# Patient Record
Sex: MALE | Employment: FULL TIME | URBAN - METROPOLITAN AREA
[De-identification: names, ages, dates, MRNs, and addresses within clinical notes are randomized per-mention and may not be internally consistent; named-entity substitution may affect disease eponyms.]

---

## 2017-04-06 ENCOUNTER — HOSPITAL ENCOUNTER (EMERGENCY)
Facility: CLINIC | Age: 22
Discharge: HOME | End: 2017-04-06
Attending: FAMILY MEDICINE | Admitting: FAMILY MEDICINE

## 2017-04-06 VITALS
WEIGHT: 142 LBS | BODY MASS INDEX: 22.58 KG/M2 | HEART RATE: 70 BPM | OXYGEN SATURATION: 100 % | RESPIRATION RATE: 20 BRPM | DIASTOLIC BLOOD PRESSURE: 68 MMHG | SYSTOLIC BLOOD PRESSURE: 111 MMHG | TEMPERATURE: 98.3 F

## 2017-04-06 DIAGNOSIS — K11.20 PAROTIDITIS: ICD-10-CM

## 2017-04-06 DIAGNOSIS — R60.9 PAROTID SWELLING: ICD-10-CM

## 2017-04-06 LAB
BASOPHILS # BLD AUTO: 0 10E9/L (ref 0–0.2)
BASOPHILS NFR BLD AUTO: 0 %
DIFFERENTIAL METHOD BLD: ABNORMAL
EOSINOPHIL # BLD AUTO: 0.2 10E9/L (ref 0–0.7)
EOSINOPHIL NFR BLD AUTO: 10 %
ERYTHROCYTE [DISTWIDTH] IN BLOOD BY AUTOMATED COUNT: 12 % (ref 10–15)
HCT VFR BLD AUTO: 45.6 % (ref 40–53)
HGB BLD-MCNC: 15 G/DL (ref 13.3–17.7)
LYMPHOCYTES # BLD AUTO: 1.2 10E9/L (ref 0.8–5.3)
LYMPHOCYTES NFR BLD AUTO: 63.6 %
MCH RBC QN AUTO: 28.4 PG (ref 26.5–33)
MCHC RBC AUTO-ENTMCNC: 32.9 G/DL (ref 31.5–36.5)
MCV RBC AUTO: 86 FL (ref 78–100)
MONOCYTES # BLD AUTO: 0.1 10E9/L (ref 0–1.3)
MONOCYTES NFR BLD AUTO: 5.5 %
NEUTROPHILS # BLD AUTO: 0.4 10E9/L (ref 1.6–8.3)
NEUTROPHILS NFR BLD AUTO: 20.9 %
PLATELET # BLD AUTO: 145 10E9/L (ref 150–450)
PLATELET # BLD EST: ABNORMAL 10*3/UL
RBC # BLD AUTO: 5.28 10E12/L (ref 4.4–5.9)
RBC MORPH BLD: NORMAL
WBC # BLD AUTO: 1.9 10E9/L (ref 4–11)

## 2017-04-06 PROCEDURE — 40000957 ZZHCL STATISTIC MUMPS VIRUS PCR: Performed by: FAMILY MEDICINE

## 2017-04-06 PROCEDURE — 85025 COMPLETE CBC W/AUTO DIFF WBC: CPT | Performed by: FAMILY MEDICINE

## 2017-04-06 PROCEDURE — 99283 EMERGENCY DEPT VISIT LOW MDM: CPT | Mod: Z6 | Performed by: FAMILY MEDICINE

## 2017-04-06 PROCEDURE — 99283 EMERGENCY DEPT VISIT LOW MDM: CPT | Performed by: FAMILY MEDICINE

## 2017-04-06 NOTE — ED AVS SNAPSHOT
[facility], Emergency Department    [address]    [place] [address]    Phone:  [phone]    Fax:  [phone]                                       Abraham Ying   MRN: [id number]    Department:  [facility], Emergency Department   Date of Visit:  4/6/2017           Patient Information     Date Of Birth          1995        Your diagnoses for this visit were:     Parotid swelling-left        You were seen by Mauricio Snyder MD.        Discharge Instructions       You have swelling in the left parotid gland. The most common cause is a small stone in the gland- the treatment for this is sucking lemon drops as much as possible- the sour test makes more saliva and the stone is pushed out.  You could also have mumps. This is an infective disease.  Suggest that you not go to class tomorrow and avoid people contact.  On Monday follow up at [facility].  The mumps test takes 9 days to return from the dept of health  Your blood count shows suppression of the white cells of the blood - this is often seen in an acute viral infection like mumps  Obviously return over the weekend if fevers or chills or vomting or increased swelling    24 Hour Appointment Hotline       To make an appointment at any [facility] clinic, call [phone] ([phone]). If you don't have a family doctor or clinic, we will help you find one. [facility] clinics are conveniently located to serve the needs of you and your family.             Review of your medicines      Our records show that you are taking the medicines listed below. If these are incorrect, please call your family doctor or clinic.        Dose / Directions Last dose taken    ibuprofen 600 MG tablet   Commonly known as:  ADVIL/MOTRIN   Dose:  600 mg   Quantity:  30 tablet        Take 1 tablet (600 mg) by mouth 3 times daily (with meals)   Refills:  1                Procedures and tests performed during your visit     CBC with platelets differential     "Mumps Confirmation PCR      Orders Needing Specimen Collection     None      Pending Results     Date and Time Order Name Status Description    2017 1745 Mumps Confirmation PCR In process             Pending Culture Results     Date and Time Order Name Status Description    2017 1745 Mumps Confirmation PCR In process             Thank you for choosing [facility]       Thank you for choosing [facility] for your care. Our goal is always to provide you with excellent care. Hearing back from our patients is one way we can continue to improve our services. Please take a few minutes to complete the written survey that you may receive in the mail after you visit with us. Thank you!        EventRadar Information     EventRadar lets you send messages to your doctor, view your test results, renew your prescriptions, schedule appointments and more. To sign up, go to [email or web address] . Click on \"Log in\" on the left side of the screen, which will take you to the Welcome page. Then click on \"Sign up Now\" on the right side of the page.     You will be asked to enter the access code listed below, as well as some personal information. Please follow the directions to create your username and password.     Your access code is: [email or web address]  Expires: 2017  6:19 PM     Your access code will  in 90 days. If you need help or a new code, please call your [facility] clinic or [phone].        Care EveryWhere ID     This is your Care EveryWhere ID. This could be used by other organizations to access your [facility] medical records  [id number]        After Visit Summary       This is your record. Keep this with you and show to your community pharmacist(s) and doctor(s) at your next visit.                  "

## 2017-04-06 NOTE — ED AVS SNAPSHOT
[facility], Emergency Department    [address]    [facility] [address]    Phone:  [phone]    Fax:  [phone]                                       Abraham Ying   MRN: [id number]    Department:  [facility], Emergency Department   Date of Visit:  4/6/2017           After Visit Summary Signature Page     I have received my discharge instructions, and my questions have been answered. I have discussed any challenges I see with this plan with the nurse or doctor.    ..........................................................................................................................................  Patient/Patient Representative Signature      ..........................................................................................................................................  Patient Representative Print Name and Relationship to Patient    ..................................................               ................................................  Date                                            Time    ..........................................................................................................................................  Reviewed by Signature/Title    ...................................................              ..............................................  Date                                                            Time

## 2017-04-06 NOTE — ED PROVIDER NOTES
History     Chief Complaint   Patient presents with     Facial Swelling     left cheek swelling over last 3 days, no tooth ache.     HPI  Abraham Ying is a 21 year old male who presents to the Emergency Department for evaluation of facial swelling. Patient states he woke up 5-6 days ago with painful left sided facial swelling that has been unchanged since then. Patient denies dental pain but has been told to have wisdom teeth removed. He also denies fevers, chills, nausea, or vomiting but reports experiencing a mild headache. Initially he had myalgias. No cough or rash or congestion. No other complaints.    PAST MEDICAL HISTORY  Past Medical History:   Diagnosis Date     NO ACTIVE PROBLEMS      PAST SURGICAL HISTORY  Past Surgical History:   Procedure Laterality Date     EYE SURGERY      5 years old     HERNIORRHAPHY INGUINAL  5/23/2012    Procedure:HERNIORRHAPHY INGUINAL; Right Inguinal Hernia Repair; Surgeon:JOHNATHON HAYS; Location:[facility] OR     NO HISTORY OF SURGERY       FAMILY HISTORY  Family History   Problem Relation Age of Onset     DIABETES Maternal Grandmother      DIABETES Maternal Grandfather      Hypertension Maternal Grandmother      Hypertension Maternal Grandfather      Hypertension Paternal Grandmother      Hypertension Paternal Grandfather      SOCIAL HISTORY  Social History   Substance Use Topics     Smoking status: Never Smoker     Smokeless tobacco: Never Used      Comment: Non smoking home     Alcohol use No     MEDICATIONS  No current facility-administered medications for this encounter.      Current Outpatient Prescriptions   Medication     ibuprofen (ADVIL/MOTRIN) 600 MG tablet     ALLERGIES  No Known Allergies      I have reviewed the Medications, Allergies, Past Medical and Surgical History, and Social History in the Epic system.    Review of Systems   Constitutional: Negative for chills and fever.   HENT: Negative for congestion, dental problem, rhinorrhea and sore throat.     Respiratory: Negative for cough.    Gastrointestinal: Negative for nausea and vomiting.   Genitourinary: Negative for dysuria, scrotal swelling and testicular pain.   Musculoskeletal: Positive for myalgias.   Skin: Negative.    Allergic/Immunologic: Negative for immunocompromised state.   Hematological: Does not bruise/bleed easily.       Physical Exam   BP: 103/62  Pulse: 70  Temp: 97  F (36.1  C)  Resp: 14  Weight: 64.4 kg (142 lb)  SpO2: 98 %  Physical Exam   Constitutional: He is oriented to person, place, and time. He appears well-developed and well-nourished. No distress.   HENT:   The left parotid gland is edematous and mildly tender.  Teeth show no abscess or acute issues  No gum swelling   Pharynx is normal  No neck nodes  TMs normal  No erythema at parotid duct entry into mouth   Neck: Normal range of motion. Neck supple.   Cardiovascular: Normal rate and regular rhythm.    Pulmonary/Chest: No respiratory distress.   Lymphadenopathy:     He has no cervical adenopathy.   Neurological: He is alert and oriented to person, place, and time.   Skin: Skin is warm and dry. No rash noted. He is not diaphoretic.   Nursing note and vitals reviewed.      ED Course     ED Course     Procedures        Initially I felt this was likely a parotid stone causing parotiditis.  WBC returns with marked neutropenia- this can be seen with acute viral infection. Mumps being possible. The pt believes he has had immunization for mmr but he is not sure. He is a [employer] student.  Discussed with infectious disease. Mumps buccal swab done and sent to MD.  No school tomorrow  Recheck wbc at [facility] in 3 days on Monday 4/10  Return if any worsening.  Lemon drops for the possibility of stone.  Labs Ordered and Resulted from Time of ED Arrival Up to the Time of Departure from the ED   CBC WITH PLATELETS DIFFERENTIAL - Abnormal; Notable for the following:        Result Value    WBC 1.9 (*)     Platelet Count 145 (*)     Absolute Neutrophil  0.4 (*)     All other components within normal limits   MUMPS CONFIRMATION PCR       Assessments & Plan (with Medical Decision Making)   Left parotid swelling    I have reviewed the nursing notes.    I have reviewed the findings, diagnosis, plan and need for follow up with the patient.    Discharge Medication List as of 4/6/2017  6:19 PM          Final diagnoses:   Parotid swelling-left     IAsiya, am serving as a trained medical scribe to document services personally performed by Mauricio Snyder MD, based on the provider's statements to me.   IMauricio MD, was physically present and have reviewed and verified the accuracy of this note documented by Asiya Ruffin.    4/6/2017   [facility], [place], EMERGENCY DEPARTMENT     Mauricio Snyder MD  04/08/17 2991

## 2017-04-06 NOTE — DISCHARGE INSTRUCTIONS
You have swelling in the left parotid gland. The most common cause is a small stone in the gland- the treatment for this is sucking lemon drops as much as possible- the sour test makes more saliva and the stone is pushed out.  You could also have mumps. This is an infective disease.  Suggest that you not go to class tomorrow and avoid people contact.  On Monday follow up at [facility].  The mumps test takes 9 days to return from the dept of health  Your blood count shows suppression of the white cells of the blood - this is often seen in an acute viral infection like mumps  Obviously return over the weekend if fevers or chills or vomting or increased swelling

## 2017-04-08 ASSESSMENT — ENCOUNTER SYMPTOMS
RHINORRHEA: 0
SORE THROAT: 0
VOMITING: 0
BRUISES/BLEEDS EASILY: 0
NAUSEA: 0
DYSURIA: 0
CHILLS: 0
COUGH: 0
MYALGIAS: 1
FEVER: 0

## 2017-05-04 ENCOUNTER — TELEPHONE (OUTPATIENT)
Dept: EMERGENCY MEDICINE | Facility: CLINIC | Age: 22
End: 2017-05-04

## 2017-05-04 NOTE — TELEPHONE ENCOUNTER
"[facility] Emergency Department Lab result notification:    Reason for call  Abraham called us earlier this afternoon for his Mumps test result from his ED visit on 4-6-17    Lab Result  Mumps Confirmation PCR [[id number]] (Order [id number])   Information table from ED Provider visit on 4/6/17  Symptoms reported at ED visit (Chief complaint, HPI)   Facial Swelling       left cheek swelling over last 3 days, no tooth ache.      HPI  Abraham Ying is a 21 year old male who presents to the Emergency Department for evaluation of facial swelling. Patient states he woke up 5-6 days ago with painful left sided facial swelling that has been unchanged since then. Patient denies dental pain but has been told to have wisdom teeth removed. He also denies fevers, chills, nausea, or vomiting but reports experiencing a mild headache. Initially he had myalgias. No cough or rash or congestion. No other complaints.      ED providers Impression and Plan (applicable information) Initially I felt this was likely a parotid stone causing parotiditis.  WBC returns with marked neutropenia- this can be seen with acute viral infection. Mumps being possible. The pt believes he has had immunization for mmr but he is not sure. He is a [employer] student.  Discussed with infectious disease. Mumps buccal swab done and sent to [facility].  No school tomorrow  Recheck wbc at [facility] in 3 days on Monday 4/10  Return if any worsening.  Lemon drops for the possibility of stone.   Miscellaneous information Provider seen 4/6/17  Mauricio Snyder MD   Emergency Medicine      RN Assessment (Patient s current Symptoms):  Patient calling for mumps test result from 4/6/17 ED visit at [facility]. He said he is feeling much better although had not heard what the test result was. Both his room mate and his girlfriend came down with mumps about a week after his ED visit. The patient said he had vacationed March 2017 in [place] [facility], "where they had a big measles " "outbreak.\"  RN Recommendations/Instructions per [place] ED lab result protocol  Lab review in EPIC said the mumps test was still in process. I called the [employer] lab at [phone] and was told the specimens go to the [facility]. I spoke with Sienna from the virology lab at [facility] ([phone]) who said they never receive the specimen. I called the [facility] lab back [phone], and Dali said I would need to speak with someone from the [facility] lab, since the specimen didn't come to the [facility] back at all. I then spoke with the [facility] Acute care lab [phone], Evening supervisor Oscar Pierce. Oscar said he would follow up on this situation and call us back, so we can let the patient know further. Oscar also said he would fill out an I Care form. I did call the patient back at 4:40 pm today to let him know we are trying to tract down the lab result and we would let him know as soon as we heard back from the [facility] lab. It also appears that his WBC and his Platelets are low. Both situations very important to follow up with a provider for further evaluation. He said he would see someone in 2-3 weeks because of his insurance situation. I advised him to be seen by a provider this week. Patient voices understanding and not sure what he will do yet.    Please Contact your PCP clinic or return to the Emergency department if your:    Symptoms return.    Symptoms worsen or other concerning symptom's.    PCP follow-up Questions asked: YES       Jessica Orantes RN  [place] [facility] RN  Lung Nodule and ED Lab Result F/u RN  Epic pool (ED late result f/u RN): P [id number]  # [phone]        "

## 2017-05-05 LAB
MUMPS CONFIRMATION PCR: NORMAL
MUMPS SPECIMEN TYPE: NORMAL

## 2017-05-05 NOTE — TELEPHONE ENCOUNTER
This nurse did receive call from Zeeshan at [facility] at approx. 10:30am, reports their ([facility]) system has a cut off time of 10:30, and if specimen not received by that time, could be delayed in being entered into their system until the next day.  This could be why they have not noted specimen yet.      At 12:45pm, this nurse did receive call from [facility] who reports they (lab) did contact patient who is not interested in finding out the results of Mumps testing, and will have charge credited to their acct.  Per lab information, they (lab) has all information indicating sample was sent and received appropriately to [facility], and lab will continue to f/u with [facility] as a root cause analysis of event, if specimen still not yet located.    Lab reports no further need for ED lab RN to f/u with this.    Adelaide Elliott, RN    [place] Access Services RN  Lung Nodule and ED Lab Results F/U RN  Epic pool (ED late result f/u RN) : [id number]   # [phone]

## 2017-06-19 ENCOUNTER — OFFICE VISIT (OUTPATIENT)
Dept: URGENT CARE | Facility: URGENT CARE | Age: 22
End: 2017-06-19
Payer: MEDICAID

## 2017-06-19 ENCOUNTER — RADIANT APPOINTMENT (OUTPATIENT)
Dept: GENERAL RADIOLOGY | Facility: CLINIC | Age: 22
End: 2017-06-19
Attending: PHYSICIAN ASSISTANT
Payer: MEDICAID

## 2017-06-19 VITALS
RESPIRATION RATE: 12 BRPM | OXYGEN SATURATION: 99 % | BODY MASS INDEX: 22.42 KG/M2 | HEART RATE: 79 BPM | WEIGHT: 141 LBS | TEMPERATURE: 98.7 F

## 2017-06-19 DIAGNOSIS — S69.92XA INJURY OF LEFT THUMB, INITIAL ENCOUNTER: Primary | ICD-10-CM

## 2017-06-19 DIAGNOSIS — K12.0 CANKER SORES ORAL: ICD-10-CM

## 2017-06-19 DIAGNOSIS — S69.92XA INJURY OF LEFT THUMB, INITIAL ENCOUNTER: ICD-10-CM

## 2017-06-19 PROCEDURE — 99214 OFFICE O/P EST MOD 30 MIN: CPT | Performed by: PHYSICIAN ASSISTANT

## 2017-06-19 PROCEDURE — 73140 X-RAY EXAM OF FINGER(S): CPT | Mod: LT

## 2017-06-19 NOTE — NURSING NOTE
"Chief Complaint   Patient presents with     Urgent Care     Trauma     patient injured his left thumb 2 months ago playing basketball. Still having pain and swelling.     Mouth Problem     sores inside the mouth      Initial Pulse 79  Temp 98.7  F (37.1  C)  Resp 12  Wt 141 lb (64 kg)  SpO2 99%  BMI 22.42 kg/m2 Estimated body mass index is 22.42 kg/(m^2) as calculated from the following:    Height as of 10/1/16: 5' 6.5\" (1.689 m).    Weight as of this encounter: 141 lb (64 kg)..    S NEVAEH, CMA    "

## 2017-06-19 NOTE — MR AVS SNAPSHOT
"              After Visit Summary   2017    Abraham Ying    MRN: [id number]           Patient Information     Date Of Birth          1995        Visit Information        Provider Department      2017 6:00 PM Symone Conti PA-C [facility]        Today's Diagnoses     Injury of left thumb, initial encounter    -  1    Canker sores oral           Follow-ups after your visit        Who to contact     If you have questions or need follow up information about today's clinic visit or your schedule please contact [facility] directly at [phone].  Normal or non-critical lab and imaging results will be communicated to you by IQumulushart, letter or phone within 4 business days after the clinic has received the results. If you do not hear from us within 7 days, please contact the clinic through IQumulushart or phone. If you have a critical or abnormal lab result, we will notify you by phone as soon as possible.  Submit refill requests through Wylio or call your pharmacy and they will forward the refill request to us. Please allow 3 business days for your refill to be completed.          Additional Information About Your Visit        MyChart Information     Wylio lets you send messages to your doctor, view your test results, renew your prescriptions, schedule appointments and more. To sign up, go to [email or web address] . Click on \"Log in\" on the left side of the screen, which will take you to the Welcome page. Then click on \"Sign up Now\" on the right side of the page.     You will be asked to enter the access code listed below, as well as some personal information. Please follow the directions to create your username and password.     Your access code is: [email or web address]  Expires: 10/25/2017  4:39 PM     Your access code will  in 90 days. If you need help or a new code, please call your [facility] clinic or [phone].        Care EveryWhere ID     This is your " Care EveryWhere ID. This could be used by other organizations to access your [facility] medical records  [id number]        Your Vitals Were     Pulse Temperature Respirations Pulse Oximetry BMI (Body Mass Index)       79 98.7  F (37.1  C) 12 99% 22.42 kg/m2        Blood Pressure from Last 3 Encounters:   04/06/17 111/68   12/23/16 110/70   10/01/16 112/56    Weight from Last 3 Encounters:   06/19/17 141 lb (64 kg)   04/06/17 142 lb (64.4 kg)   12/23/16 137 lb (62.1 kg)                 Today's Medication Changes          These changes are accurate as of: 6/19/17 11:59 PM.  If you have any questions, ask your nurse or doctor.               Start taking these medicines.        Dose/Directions    order for DME   Used for:  Injury of left thumb, initial encounter   Started by:  Symone Conti PA-C        Thumb brace   Quantity:  1 Device   Refills:  0            Where to get your medicines      Some of these will need a paper prescription and others can be bought over the counter.  Ask your nurse if you have questions.     Bring a paper prescription for each of these medications     order for DME                Primary Care Provider    Physician No Ref-Primary       No address on file        Equal Access to Services     SHELTON GONZALEZ AH: Gregory Oakes, wamaribeth molina, qaybta kaalmada ceci, hemanth elder. So [facility] [phone].    ATENCIÓN: Si habla español, tiene a jeong disposición servicios gratuitos de asistencia lingüística. Llame al [phone].    We comply with applicable federal civil rights laws and [place] laws. We do not discriminate on the basis of race, color, national origin, age, disability sex, sexual orientation or gender identity.            Thank you!     Thank you for choosing [facility]  for your care. Our goal is always to provide you with excellent care. Hearing back from our patients is one way we can continue to improve our  services. Please take a few minutes to complete the written survey that you may receive in the mail after your visit with us. Thank you!             Your Updated Medication List - Protect others around you: Learn how to safely use, store and throw away your medicines at www.disposemymeds.org.          This list is accurate as of: 6/19/17 11:59 PM.  Always use your most recent med list.                   Brand Name Dispense Instructions for use Diagnosis    ibuprofen 600 MG tablet    ADVIL/MOTRIN    30 tablet    Take 1 tablet (600 mg) by mouth 3 times daily (with meals)    Acute left-sided low back pain with left-sided sciatica       order for DME     1 Device    Thumb brace    Injury of left thumb, initial encounter

## 2017-07-27 NOTE — PROGRESS NOTES
SUBJECTIVE:  Chief Complaint   Patient presents with     Urgent Care     Trauma     patient injured his left thumb 2 months ago playing basketball. Still having pain and swelling.     Mouth Problem     sores inside the mouth     Abraham Ying is a 21 year old male presents with a chief complaint of left thunb pain, swelling, tenderness and decreased range of motion.  The injury occurred 2 month(s) ago.   The injury happened while playing basketball. How: jammed and bent backward.  Still continues to have pain and swelling.  Has continued to play.  Wants x-ray.   Pain exacerbated by flexion/extension.  Relieved by rest.  He treated it initially with no therapy. This is the first time this type of injury has occurred to this patient.   Also with some canker sores in mouth    Past Medical History:   Diagnosis Date     NO ACTIVE PROBLEMS      Current Outpatient Prescriptions   Medication Sig Dispense Refill     order for DME Thumb brace 1 Device 0     ibuprofen (ADVIL/MOTRIN) 600 MG tablet Take 1 tablet (600 mg) by mouth 3 times daily (with meals) (Patient not taking: Reported on 6/19/2017) 30 tablet 1     Social History   Substance Use Topics     Smoking status: Never Smoker     Smokeless tobacco: Never Used      Comment: Non smoking home     Alcohol use No       ROS:  Review of systems negative except as stated above.    EXAM:   Pulse 79  Temp 98.7  F (37.1  C)  Resp 12  Wt 141 lb (64 kg)  SpO2 99%  BMI 22.42 kg/m2  Gen: healthy,alert,no distress  Extremity: finger  first has FROM but focal tenderness in MCP joint.  Very mild swelling noted.  Tendon function intact and normal strength.  .   There is not compromise to the distal circulation.  Pulses are +2 and CRT is brisk  GENERAL APPEARANCE: healthy, alert and no distress  EXTREMITIES: peripheral pulses normal  SKIN: no suspicious lesions or rashes  NEURO: Normal strength and tone, sensory exam grossly normal, mentation intact and speech normal  MOUTH-  2  canker sores noted.      X-RAY was done. - no fracture noted.      assessment/plan:  (S69.92XA) Injury of left thumb, initial encounter  (primary encounter diagnosis)  Comment:   Plan: XR Finger Left G/E 2 Views, order for DME        Ice and OTC med for swelling and pain.  Thumb brace given to isolate for next 2 weeks and needs to rest thumb.   Fu with sports med if sx persist      (K12.0) Canker sores oral  Comment:   Plan: supportive cares.  Declines numbing med.  Reassured that will resolve on own.  FU with PCP as needed.

## 2021-02-09 ENCOUNTER — OFFICE VISIT (OUTPATIENT)
Dept: FAMILY MEDICINE | Facility: CLINIC | Age: 26
End: 2021-02-09
Payer: COMMERCIAL

## 2021-02-09 VITALS
WEIGHT: 162 LBS | SYSTOLIC BLOOD PRESSURE: 113 MMHG | DIASTOLIC BLOOD PRESSURE: 76 MMHG | OXYGEN SATURATION: 97 % | HEART RATE: 89 BPM | HEIGHT: 68 IN | RESPIRATION RATE: 16 BRPM | TEMPERATURE: 97.7 F | BODY MASS INDEX: 24.55 KG/M2

## 2021-02-09 DIAGNOSIS — Z11.3 SCREEN FOR STD (SEXUALLY TRANSMITTED DISEASE): ICD-10-CM

## 2021-02-09 DIAGNOSIS — R10.84 ABDOMINAL PAIN, GENERALIZED: Primary | ICD-10-CM

## 2021-02-09 DIAGNOSIS — D70.9 NEUTROPENIA, UNSPECIFIED TYPE (H): ICD-10-CM

## 2021-02-09 LAB
BASOPHILS # BLD AUTO: 0 10E9/L (ref 0–0.2)
BASOPHILS NFR BLD AUTO: 1.1 %
CRP SERPL-MCNC: <2.9 MG/L (ref 0–8)
DIFFERENTIAL METHOD BLD: ABNORMAL
EOSINOPHIL # BLD AUTO: 0.1 10E9/L (ref 0–0.7)
EOSINOPHIL NFR BLD AUTO: 3.9 %
ERYTHROCYTE [DISTWIDTH] IN BLOOD BY AUTOMATED COUNT: 12 % (ref 10–15)
HCT VFR BLD AUTO: 47.2 % (ref 40–53)
HGB BLD-MCNC: 16.1 G/DL (ref 13.3–17.7)
LIPASE SERPL-CCNC: 148 U/L (ref 73–393)
LYMPHOCYTES # BLD AUTO: 1 10E9/L (ref 0.8–5.3)
LYMPHOCYTES NFR BLD AUTO: 35 %
MCH RBC QN AUTO: 28.9 PG (ref 26.5–33)
MCHC RBC AUTO-ENTMCNC: 34.1 G/DL (ref 31.5–36.5)
MCV RBC AUTO: 85 FL (ref 78–100)
MONOCYTES # BLD AUTO: 0.3 10E9/L (ref 0–1.3)
MONOCYTES NFR BLD AUTO: 8.8 %
NEUTROPHILS # BLD AUTO: 1.5 10E9/L (ref 1.6–8.3)
NEUTROPHILS NFR BLD AUTO: 51.2 %
PLATELET # BLD AUTO: 209 10E9/L (ref 150–450)
RBC # BLD AUTO: 5.57 10E12/L (ref 4.4–5.9)
T PALLIDUM AB SER QL: NONREACTIVE
WBC # BLD AUTO: 2.8 10E9/L (ref 4–11)

## 2021-02-09 PROCEDURE — 83516 IMMUNOASSAY NONANTIBODY: CPT | Performed by: PHYSICIAN ASSISTANT

## 2021-02-09 PROCEDURE — 80053 COMPREHEN METABOLIC PANEL: CPT | Performed by: PHYSICIAN ASSISTANT

## 2021-02-09 PROCEDURE — 85025 COMPLETE CBC W/AUTO DIFF WBC: CPT | Performed by: PHYSICIAN ASSISTANT

## 2021-02-09 PROCEDURE — 83690 ASSAY OF LIPASE: CPT | Performed by: PHYSICIAN ASSISTANT

## 2021-02-09 PROCEDURE — 86803 HEPATITIS C AB TEST: CPT | Performed by: PHYSICIAN ASSISTANT

## 2021-02-09 PROCEDURE — 36415 COLL VENOUS BLD VENIPUNCTURE: CPT | Performed by: PHYSICIAN ASSISTANT

## 2021-02-09 PROCEDURE — 99000 SPECIMEN HANDLING OFFICE-LAB: CPT | Performed by: PHYSICIAN ASSISTANT

## 2021-02-09 PROCEDURE — 86140 C-REACTIVE PROTEIN: CPT | Performed by: PHYSICIAN ASSISTANT

## 2021-02-09 PROCEDURE — 87591 N.GONORRHOEAE DNA AMP PROB: CPT | Performed by: PHYSICIAN ASSISTANT

## 2021-02-09 PROCEDURE — 86780 TREPONEMA PALLIDUM: CPT | Mod: 90 | Performed by: PHYSICIAN ASSISTANT

## 2021-02-09 PROCEDURE — 87491 CHLMYD TRACH DNA AMP PROBE: CPT | Performed by: PHYSICIAN ASSISTANT

## 2021-02-09 PROCEDURE — 82150 ASSAY OF AMYLASE: CPT | Performed by: PHYSICIAN ASSISTANT

## 2021-02-09 PROCEDURE — 99203 OFFICE O/P NEW LOW 30 MIN: CPT | Performed by: PHYSICIAN ASSISTANT

## 2021-02-09 PROCEDURE — 86256 FLUORESCENT ANTIBODY TITER: CPT | Mod: 90 | Performed by: PHYSICIAN ASSISTANT

## 2021-02-09 PROCEDURE — 87389 HIV-1 AG W/HIV-1&-2 AB AG IA: CPT | Performed by: PHYSICIAN ASSISTANT

## 2021-02-09 ASSESSMENT — MIFFLIN-ST. JEOR: SCORE: 1699.33

## 2021-02-09 NOTE — PROGRESS NOTES
Assessment & Plan     Abdominal pain, generalized    - CBC with platelets differential  - CRP, inflammation  - Comprehensive metabolic panel  - Lipase  - Amylase  - Endomysial Antibody IgA by IFA  - Deamidated Gliadin Peptide Allison IgA IgG  - Tissue transglutaminase allison IgA and IgG    Screen for STD (sexually transmitted disease)    - NEISSERIA GONORRHOEA PCR  - CHLAMYDIA TRACHOMATIS PCR  - HIV Antigen Antibody Combo  - Treponema Abs w Reflex to RPR and Titer  - Hepatitis C antibody    Neutropenia, unspecified type (H)  CBC did come back showing low WBC, which has been seen in past.  Was quite low 3 years ago and was thought to be post viral.  No recent symptoms to explain that today.  Will contact hematology to see if I can start work up or if they would rather see.  - E-CONSULT TO HEMATOLOGY (ADULT OUTPATIENT PCP TO SPECIALIST)             Tobacco Cessation:   reports that he has been smoking other. He has never used smokeless tobacco.          No follow-ups on file.    PATRICIA [facility]   Axel is a 25 year old who presents for the following health issues     HPI       Abdominal/Flank Pain  Onset/Duration: 5-6 months ago   Description:   Character: Cramping  Location: epigastric region  Radiation:  Back  Intensity: moderate  Progression of Symptoms:  same  Accompanying Signs & Symptoms:  Fever/Chills: no  Gas/Bloating: YES  Nausea: YES- sometimes  Vomitting: no  Diarrhea: YES  Constipation: YES  Dysuria or Hematuria: no  History:   Trauma: no  Previous similar pain: no  Previous tests done: none  Precipitating factors:   Does the pain change with:     Food: YES    Bowel Movement: YES    Urination: no   Other factors:  no  Therapies tried and outcome: change in diet , increased in fluids          Review of Systems   Constitutional, HEENT, cardiovascular, pulmonary, gi and gu systems are negative, except as otherwise noted.      Objective    /76    "Pulse 89   Temp 97.7  F (36.5  C) (Skin)   Resp 16   Ht 1.727 m (5' 8\")   Wt 73.5 kg (162 lb)   SpO2 97%   BMI 24.63 kg/m    Body mass index is 24.63 kg/m .  Physical Exam   GENERAL: alert and no distress  EYES: Eyes grossly normal to inspection  RESP: lungs clear to auscultation - no rales, rhonchi or wheezes  CV: regular rate and rhythm, normal S1 S2, no S3 or S4, no murmur, click or rub, no peripheral edema and peripheral pulses strong  ABDOMEN: soft, nontender, no hepatosplenomegaly, no masses and bowel sounds normal  PSYCH: mentation appears normal, affect normal/bright    Results for orders placed or performed in visit on 02/09/21   CBC with platelets differential     Status: Abnormal   Result Value Ref Range    WBC 2.8 (L) 4.0 - 11.0 10e9/L    RBC Count 5.57 4.4 - 5.9 10e12/L    Hemoglobin 16.1 13.3 - 17.7 g/dL    Hematocrit 47.2 40.0 - 53.0 %    MCV 85 78 - 100 fl    MCH 28.9 26.5 - 33.0 pg    MCHC 34.1 31.5 - 36.5 g/dL    RDW 12.0 10.0 - 15.0 %    Platelet Count 209 150 - 450 10e9/L    % Neutrophils 51.2 %    % Lymphocytes 35.0 %    % Monocytes 8.8 %    % Eosinophils 3.9 %    % Basophils 1.1 %    Absolute Neutrophil 1.5 (L) 1.6 - 8.3 10e9/L    Absolute Lymphocytes 1.0 0.8 - 5.3 10e9/L    Absolute Monocytes 0.3 0.0 - 1.3 10e9/L    Absolute Eosinophils 0.1 0.0 - 0.7 10e9/L    Absolute Basophils 0.0 0.0 - 0.2 10e9/L    Diff Method Automated Method    CRP, inflammation     Status: None   Result Value Ref Range    CRP Inflammation <2.9 0.0 - 8.0 mg/L   Comprehensive metabolic panel     Status: Abnormal   Result Value Ref Range    Sodium 140 133 - 144 mmol/L    Potassium 3.9 3.4 - 5.3 mmol/L    Chloride 109 94 - 109 mmol/L    Carbon Dioxide 24 20 - 32 mmol/L    Anion Gap 7 3 - 14 mmol/L    Glucose 116 (H) 70 - 99 mg/dL    Urea Nitrogen 15 7 - 30 mg/dL    Creatinine 0.81 0.66 - 1.25 mg/dL    GFR Estimate >90 >60 mL/min/[1.73_m2]    GFR Estimate If Black >90 >60 mL/min/[1.73_m2]    Calcium 9.5 8.5 - 10.1 " mg/dL    Bilirubin Total 0.5 0.2 - 1.3 mg/dL    Albumin 3.9 3.4 - 5.0 g/dL    Protein Total 7.3 6.8 - 8.8 g/dL    Alkaline Phosphatase 59 40 - 150 U/L    ALT 23 0 - 70 U/L    AST 11 0 - 45 U/L   Lipase     Status: None   Result Value Ref Range    Lipase 148 73 - 393 U/L   Amylase     Status: None   Result Value Ref Range    Amylase 91 30 - 110 U/L   Endomysial Antibody IgA by IFA     Status: None   Result Value Ref Range    Endomysial Antibody IgA by IFA <1:10 <1:10   Deamidated Gliadin Peptide Allison IgA IgG     Status: None   Result Value Ref Range    Deamidated Gliadin Allison, IgA 2 <7 U/mL    Deamidated Gliadin Allison, IgG 1 <7 U/mL   Tissue transglutaminase allison IgA and IgG     Status: None   Result Value Ref Range    Tissue Transglutaminase Antibody IgA 1 <7 U/mL    Tissue Transglutaminase Allison IgG <1 <7 U/mL   HIV Antigen Antibody Combo     Status: None   Result Value Ref Range    HIV Antigen Antibody Combo Nonreactive NR^Nonreactive       Treponema Abs w Reflex to RPR and Titer     Status: None   Result Value Ref Range    Treponema Antibodies Nonreactive NR^Nonreactive   Hepatitis C antibody     Status: None   Result Value Ref Range    Hepatitis C Antibody Nonreactive NR^Nonreactive   NEISSERIA GONORRHOEA PCR     Status: None    Specimen: Urine   Result Value Ref Range    Specimen Descrip Urine     N Gonorrhea PCR Negative NEG^Negative   CHLAMYDIA TRACHOMATIS PCR     Status: None    Specimen: Urine   Result Value Ref Range    Specimen Description Urine     Chlamydia Trachomatis PCR Negative NEG^Negative

## 2021-02-10 LAB
ALBUMIN SERPL-MCNC: 3.9 G/DL (ref 3.4–5)
ALP SERPL-CCNC: 59 U/L (ref 40–150)
ALT SERPL W P-5'-P-CCNC: 23 U/L (ref 0–70)
AMYLASE SERPL-CCNC: 91 U/L (ref 30–110)
ANION GAP SERPL CALCULATED.3IONS-SCNC: 7 MMOL/L (ref 3–14)
AST SERPL W P-5'-P-CCNC: 11 U/L (ref 0–45)
BILIRUB SERPL-MCNC: 0.5 MG/DL (ref 0.2–1.3)
BUN SERPL-MCNC: 15 MG/DL (ref 7–30)
C TRACH DNA SPEC QL NAA+PROBE: NEGATIVE
CALCIUM SERPL-MCNC: 9.5 MG/DL (ref 8.5–10.1)
CHLORIDE SERPL-SCNC: 109 MMOL/L (ref 94–109)
CO2 SERPL-SCNC: 24 MMOL/L (ref 20–32)
CREAT SERPL-MCNC: 0.81 MG/DL (ref 0.66–1.25)
ENDOMYSIUM IGA TITR SER IF: NORMAL {TITER}
GFR SERPL CREATININE-BSD FRML MDRD: >90 ML/MIN/{1.73_M2}
GLIADIN IGA SER-ACNC: 2 U/ML
GLIADIN IGG SER-ACNC: 1 U/ML
GLUCOSE SERPL-MCNC: 116 MG/DL (ref 70–99)
HCV AB SERPL QL IA: NONREACTIVE
HIV 1+2 AB+HIV1 P24 AG SERPL QL IA: NONREACTIVE
N GONORRHOEA DNA SPEC QL NAA+PROBE: NEGATIVE
POTASSIUM SERPL-SCNC: 3.9 MMOL/L (ref 3.4–5.3)
PROT SERPL-MCNC: 7.3 G/DL (ref 6.8–8.8)
SODIUM SERPL-SCNC: 140 MMOL/L (ref 133–144)
SPECIMEN SOURCE: NORMAL
SPECIMEN SOURCE: NORMAL
TTG IGA SER-ACNC: 1 U/ML
TTG IGG SER-ACNC: <1 U/ML

## 2021-02-11 NOTE — RESULT ENCOUNTER NOTE
Please call with results.    Negative for all STDs.  No sign of celiac disease (gluten issues), or inflammatory causes.  No sign of pancreatic stress.  While I am unsure if it is playing a role in his symptoms, his white blood cell count is low.  He did have this 3 years ago, which was thought to be post viral.  I do think this deserves some follow up, and have referred him to hematology for further evaluation.  May be playing a role in his symptoms, will let them weigh in.    Zeeshan Olea PA-C

## 2021-02-12 ENCOUNTER — E-CONSULT (OUTPATIENT)
Dept: ONCOLOGY | Facility: CLINIC | Age: 26
End: 2021-02-12
Payer: COMMERCIAL

## 2021-02-12 ENCOUNTER — TELEPHONE (OUTPATIENT)
Dept: FAMILY MEDICINE | Facility: CLINIC | Age: 26
End: 2021-02-12

## 2021-02-12 DIAGNOSIS — D70.9 NEUTROPENIA, UNSPECIFIED TYPE (H): Primary | ICD-10-CM

## 2021-02-12 PROCEDURE — 99451 NTRPROF PH1/NTRNET/EHR 5/>: CPT | Performed by: INTERNAL MEDICINE

## 2021-02-12 NOTE — TELEPHONE ENCOUNTER
LUC,  HANNAH    Informed pt below  We can call him on cell when next steps are available and and ok to leave a detailed message.    Thanks,  Carla Zavala RN          Can call with the above note, but I am doing an E consult with the hematologist, so they will contact me directed on next steps for him.     Zeeshan Olea, Brad Marie PA-C   2/11/2021  5:42 PM CST      Please call with results.     Negative for all STDs.  No sign of celiac disease (gluten issues), or inflammatory causes.  No sign of pancreatic stress.  While I am unsure if it is playing a role in his symptoms, his white blood cell count is low.  He did have this 3 years ago, which was thought to be post viral.  I do think this deserves some follow up, and have referred him to hematology for further evaluation.  May be playing a role in his symptoms, will let them weigh in.     Zeeshan Olea PA-C

## 2021-02-12 NOTE — PROGRESS NOTES
"2/12/2021     E-Consult has been accepted. ++Must be completed or will be sent back to the E-Consultant    Interprofessional consultation requested by:  Brad Olea PA-C      Clinical Question/Purpose: neutropenia    Patient assessment and information reviewed: 25 year old man had neutropenia, mild thrombocytopenia when acutely ill with parotiditis ins 2017. Now on 2/9/21, has absolute neutrophil count (ANC) of 1.5 with total WBC of 2.8, normal Hgb and platelet count. He had labs due to vague abdominal pain. He has negative TTG antibodies. Chart has no record of problems with frequent infections. Since he has normal Hgb and platelet count, unlikely to have a serious bone marrow problem. He is negative for HIV and hep C, but still need to consider hepatitis B. Also need to check B12.  My suspicion for these are low, but would not want to miss.  The most likely cause of the mild neutropenia is that he simiply has a slightly lower set point for his neutrophil count - our \"normal\" values are based mainly on people of northern  ancestry, so people of other ancestry may run a WBC and neutrophil count that is slightly lower than \"normal\" and of no clinical concern. An ANC of 1.5 is completely adequate, and would be listed as within normal limits in many labs. All of his other subsets of white cells are normal, so I am not concerned about a total WBC of 2.8. But it does show the importance of obtaining a differential in him if a CBC is checked.    Recommendations: Check B12, hepatitis B surface antibody, surface antigen and core antibody.   No need for routine checks of complete blood count with differential. But if checked because he has symptoms that warrant checking the blood counts (fever, symptoms of pneumonia, etc), always check a differential.        The recommendations provided in this E-Consult are based on the clinical data available to me at this time, and are furnished without the benefit of " a comprehensive in-person or virtual patient evaluation, Any new clinical issues or changes in patient status since the filing of this E-Consult will need to be taken into account when assessing these recommendations. Please contact me if you have further questions.    My total time spent reviewing clinical information and formulating assessment was 10 minutes.    Report sent automatically to requesting provider once signed.     Charge codes: [id number] (5+ minutes)                  [id number] (No Charge code)    Brigitte Be MD  Hematology

## 2021-02-12 NOTE — TELEPHONE ENCOUNTER
I did get the consult back from the specialist.  They do not think this is of huge concern, and most likely just a normal finding for him.  The did recommend a follow up blood test, which I will place, so he can schedule that follow up.

## 2021-03-12 ENCOUNTER — TELEPHONE (OUTPATIENT)
Dept: FAMILY MEDICINE | Facility: CLINIC | Age: 26
End: 2021-03-12

## 2021-03-12 DIAGNOSIS — D70.9 NEUTROPENIA, UNSPECIFIED TYPE (H): ICD-10-CM

## 2021-03-12 LAB — VIT B12 SERPL-MCNC: 369 PG/ML (ref 193–986)

## 2021-03-12 PROCEDURE — 87340 HEPATITIS B SURFACE AG IA: CPT | Performed by: PHYSICIAN ASSISTANT

## 2021-03-12 PROCEDURE — 82607 VITAMIN B-12: CPT | Performed by: PHYSICIAN ASSISTANT

## 2021-03-12 PROCEDURE — 86704 HEP B CORE ANTIBODY TOTAL: CPT | Performed by: PHYSICIAN ASSISTANT

## 2021-03-12 PROCEDURE — 86706 HEP B SURFACE ANTIBODY: CPT | Performed by: PHYSICIAN ASSISTANT

## 2021-03-12 PROCEDURE — 36415 COLL VENOUS BLD VENIPUNCTURE: CPT | Performed by: PHYSICIAN ASSISTANT

## 2021-03-12 NOTE — TELEPHONE ENCOUNTER
Reason for Call:  Other questions     detailed comments: patient came in for lab work today.  He also has questions for Zeeshan and wants to know if issues could be due to celiac disease or IBS. And patient is asking for a call back,     Phone Number Patient can be reached at: Cell number on file:    Telephone Information:   Mobile [phone]     Best Time: any    Can we leave a detailed message on this number? YES    Call taken on 3/12/2021 at 2:03 PM by Jessica Oconnell

## 2021-03-12 NOTE — TELEPHONE ENCOUNTER
JS,  Spoke to pt and he is wanting to know if his symptoms are related to celiac or IBS.  Please advise.  Thanks,  Carla Zavala RN

## 2021-03-12 NOTE — LETTER
March 15, 2021      Abraham SEAN Ying  [id number] [facility]  [place] [address]        Dear ,    We are writing to inform you of your test results.    Your test results fall within the expected range(s) or remain unchanged from previous results.  Please continue with current treatment plan.    Resulted Orders   Hepatitis B core antibody   Result Value Ref Range    Hepatitis B Core Ann Nonreactive NR^Nonreactive   Hepatitis B Surface Antibody   Result Value Ref Range    Hepatitis B Surface Antibody 1.11 <8.00 m[IU]/mL      Comment:      Nonreactive, No antibody detected when the value is less than 8.00 m[IU]/mL.   Hepatitis B surface antigen   Result Value Ref Range    Hep B Surface Agn Nonreactive NR^Nonreactive   Vitamin B12   Result Value Ref Range    Vitamin B12 369 193 - 986 pg/mL       If you have any questions or concerns, please call the clinic at the number listed above.       Sincerely,      Brad Olea PA-C

## 2021-03-14 LAB
HBV CORE AB SERPL QL IA: NONREACTIVE
HBV SURFACE AB SERPL IA-ACNC: 1.11 M[IU]/ML
HBV SURFACE AG SERPL QL IA: NONREACTIVE

## 2021-03-15 NOTE — TELEPHONE ENCOUNTER
Not celiac, we tested for that.  IBS is a diagnosis of exclusion, meaning once we rule everything else out, patients tend to get that diagnosis, so it is possible he has that.  If he is still having symptoms, would recommend follow volodymyr Olea PA-C

## 2022-08-12 ENCOUNTER — HOSPITAL ENCOUNTER (EMERGENCY)
Facility: CLINIC | Age: 27
Discharge: HOME | End: 2022-08-12
Attending: EMERGENCY MEDICINE | Admitting: EMERGENCY MEDICINE
Payer: COMMERCIAL

## 2022-08-12 ENCOUNTER — APPOINTMENT (OUTPATIENT)
Dept: GENERAL RADIOLOGY | Facility: CLINIC | Age: 27
End: 2022-08-12
Attending: EMERGENCY MEDICINE
Payer: COMMERCIAL

## 2022-08-12 ENCOUNTER — OFFICE VISIT (OUTPATIENT)
Dept: URGENT CARE | Facility: URGENT CARE | Age: 27
End: 2022-08-12
Payer: COMMERCIAL

## 2022-08-12 VITALS
SYSTOLIC BLOOD PRESSURE: 115 MMHG | DIASTOLIC BLOOD PRESSURE: 77 MMHG | HEART RATE: 75 BPM | OXYGEN SATURATION: 100 % | RESPIRATION RATE: 21 BRPM | TEMPERATURE: 98.3 F

## 2022-08-12 VITALS
SYSTOLIC BLOOD PRESSURE: 112 MMHG | TEMPERATURE: 99.9 F | HEART RATE: 80 BPM | DIASTOLIC BLOOD PRESSURE: 71 MMHG | RESPIRATION RATE: 20 BRPM | OXYGEN SATURATION: 98 %

## 2022-08-12 DIAGNOSIS — R07.9 ACUTE CHEST PAIN: Primary | ICD-10-CM

## 2022-08-12 DIAGNOSIS — R07.9 CHEST PAIN, UNSPECIFIED TYPE: ICD-10-CM

## 2022-08-12 LAB
ALBUMIN SERPL BCG-MCNC: 4.5 G/DL (ref 3.5–5.2)
ALP SERPL-CCNC: 40 U/L (ref 40–129)
ALT SERPL W P-5'-P-CCNC: 18 U/L (ref 10–50)
ANION GAP SERPL CALCULATED.3IONS-SCNC: 10 MMOL/L (ref 7–15)
AST SERPL W P-5'-P-CCNC: 19 U/L (ref 10–50)
BASOPHILS # BLD AUTO: 0 10E3/UL (ref 0–0.2)
BASOPHILS NFR BLD AUTO: 1 %
BILIRUB SERPL-MCNC: 0.4 MG/DL
BUN SERPL-MCNC: 8.9 MG/DL (ref 6–20)
CALCIUM SERPL-MCNC: 9.6 MG/DL (ref 8.6–10)
CHLORIDE SERPL-SCNC: 101 MMOL/L (ref 98–107)
CREAT SERPL-MCNC: 0.81 MG/DL (ref 0.67–1.17)
D DIMER PPP FEU-MCNC: <0.27 UG/ML FEU (ref 0–0.5)
DEPRECATED HCO3 PLAS-SCNC: 30 MMOL/L (ref 22–29)
EOSINOPHIL # BLD AUTO: 0.1 10E3/UL (ref 0–0.7)
EOSINOPHIL NFR BLD AUTO: 4 %
ERYTHROCYTE [DISTWIDTH] IN BLOOD BY AUTOMATED COUNT: 11.9 % (ref 10–15)
GFR SERPL CREATININE-BSD FRML MDRD: >90 ML/MIN/1.73M2
GLUCOSE SERPL-MCNC: 89 MG/DL (ref 70–99)
HCT VFR BLD AUTO: 47.9 % (ref 40–53)
HGB BLD-MCNC: 15.6 G/DL (ref 13.3–17.7)
HOLD SPECIMEN: NORMAL
IMM GRANULOCYTES # BLD: 0 10E3/UL
IMM GRANULOCYTES NFR BLD: 0 %
LYMPHOCYTES # BLD AUTO: 1.1 10E3/UL (ref 0.8–5.3)
LYMPHOCYTES NFR BLD AUTO: 38 %
MCH RBC QN AUTO: 28.6 PG (ref 26.5–33)
MCHC RBC AUTO-ENTMCNC: 32.6 G/DL (ref 31.5–36.5)
MCV RBC AUTO: 88 FL (ref 78–100)
MONOCYTES # BLD AUTO: 0.2 10E3/UL (ref 0–1.3)
MONOCYTES NFR BLD AUTO: 7 %
NEUTROPHILS # BLD AUTO: 1.4 10E3/UL (ref 1.6–8.3)
NEUTROPHILS NFR BLD AUTO: 50 %
NRBC # BLD AUTO: 0 10E3/UL
NRBC BLD AUTO-RTO: 0 /100
PLATELET # BLD AUTO: 210 10E3/UL (ref 150–450)
POTASSIUM SERPL-SCNC: 4.1 MMOL/L (ref 3.4–5.3)
PROT SERPL-MCNC: 7.4 G/DL (ref 6.4–8.3)
RBC # BLD AUTO: 5.45 10E6/UL (ref 4.4–5.9)
SODIUM SERPL-SCNC: 141 MMOL/L (ref 136–145)
TROPONIN T SERPL HS-MCNC: <6 NG/L
WBC # BLD AUTO: 2.8 10E3/UL (ref 4–11)

## 2022-08-12 PROCEDURE — 71046 X-RAY EXAM CHEST 2 VIEWS: CPT

## 2022-08-12 PROCEDURE — 80053 COMPREHEN METABOLIC PANEL: CPT | Performed by: EMERGENCY MEDICINE

## 2022-08-12 PROCEDURE — 99284 EMERGENCY DEPT VISIT MOD MDM: CPT | Mod: 25

## 2022-08-12 PROCEDURE — 36415 COLL VENOUS BLD VENIPUNCTURE: CPT | Performed by: EMERGENCY MEDICINE

## 2022-08-12 PROCEDURE — 84484 ASSAY OF TROPONIN QUANT: CPT | Performed by: EMERGENCY MEDICINE

## 2022-08-12 PROCEDURE — 85025 COMPLETE CBC W/AUTO DIFF WBC: CPT | Performed by: EMERGENCY MEDICINE

## 2022-08-12 PROCEDURE — 85379 FIBRIN DEGRADATION QUANT: CPT | Performed by: EMERGENCY MEDICINE

## 2022-08-12 PROCEDURE — 93000 ELECTROCARDIOGRAM COMPLETE: CPT | Performed by: FAMILY MEDICINE

## 2022-08-12 PROCEDURE — 99214 OFFICE O/P EST MOD 30 MIN: CPT | Performed by: FAMILY MEDICINE

## 2022-08-12 ASSESSMENT — ENCOUNTER SYMPTOMS
SHORTNESS OF BREATH: 1
COLOR CHANGE: 0
COUGH: 0
EYE PAIN: 0
ABDOMINAL DISTENTION: 1
HEADACHES: 0
RHINORRHEA: 0
NUMBNESS: 0
BACK PAIN: 0
ABDOMINAL PAIN: 1
HEMATURIA: 0
DYSURIA: 0
BLOOD IN STOOL: 0
AGITATION: 0
FEVER: 0
PHOTOPHOBIA: 0
SORE THROAT: 0
NECK PAIN: 0
DIZZINESS: 0
CONSTIPATION: 1
CHILLS: 0

## 2022-08-12 ASSESSMENT — ACTIVITIES OF DAILY LIVING (ADL)
ADLS_ACUITY_SCORE: 35

## 2022-08-12 NOTE — PROGRESS NOTES
SUBJECTIVE:  Abraham Ying is a 26 year old male--he does vape-- who presents to the office with the CC of chest pain (left-sided sharp pains lasting a few seconds at a time, no obvious triggers)..  The pain is not worse with position/breathing/exertion/movement.  There have been 12-15 episodes so far of the chest discomfort.  No shortness of breath.  No increased sweats.  No radiation of the discomfort.  No recent cough.    Pain is exacerbated by nothing.  Pain is relieved by nothing.  Cardiac risk factors: negative.     No recent injury to the torso.        Past Medical History:   Diagnosis Date     NO ACTIVE PROBLEMS        No current outpatient medications on file.    Social History     Tobacco Use     Smoking status: Current Every Day Smoker     Types: Other     Smokeless tobacco: Never Used     Tobacco comment: vaping    Substance Use Topics     Alcohol use: Yes     Comment: occ.        EXAM:  /71   Pulse 80   Temp 99.9  F (37.7  C) (Tympanic)   Resp 20   SpO2 98%   GENERAL APPEARANCE: healthy, alert and no distress.  No acute respiratory distress.  Patient is sitting comfortably.      Office EKG demonstrates:  appears normal, NSR, there is a left axis deviation present, normal intervals, no acute ST/T changes c/w ischemia,no LVH by voltage criteria,unchanged from previous tracings    Assessment  / IMPRESSION  Acute left-sided chest pain, patient is hemodynamically stable.    Left axis deviation on EKG    PLAN:  Patient will go to the [facility] emergency room for further evaluation of the left-sided chest discomfort.      Derick Yu MD

## 2022-08-12 NOTE — ED TRIAGE NOTES
Pt with c/o intermintent L sided CP since yesterday. Reports vaping. Denies SOB or calf pain. ABC intact. UC did EKG-SR.

## 2022-08-12 NOTE — DISCHARGE INSTRUCTIONS
Please follow-up with your primary care provider and/or specialist regarding your visit to the ER today.    Please return to the emergency department should you experience any of the symptoms we specifically discussed, including but not limited to recurrence or worsening of your symptoms, or development of any new and concerning symptoms.

## 2022-08-12 NOTE — ED PROVIDER NOTES
History   Chief Complaint:  Chest Pain       The history is provided by the patient.      Abraham Ying is a 26 year old male who presents with intermittent stabbing left-sided chest pain. For the past year he has had stabbing left-sided chest pain once a month which lasted 15-20 seconds. This past week he has had increasing episodes of chest pain that lasts for 15-20 seconds. He reports that yesterday he had 12 episodes a day. Today he has already had 10 episodes with his most recent occurrence being 20 minutes ago. He reports that the pain occurs while he is standing, laying, or sitting down. During the episodes of chest pain he becomes short of breath and it becomes painful to breath deeply. He is very active and the pain is not exacerbated through exertion. He does report vaping for the past 4-5 years along with smoking weed. He reports no chest pain while he is vaping. He has never been seen regarding these symptoms. Additionally he has abdominal pain, constipation, and distension. He denies fever, cough, sore throat, headache, dizziness, numbness, tingling, diarrhea, hematuria, dysuria, black or bloody stools, syncope, or leg swelling. He denies use of steroids or recent travel. He has no history of blood clots.       Review of Systems   Constitutional: Negative for chills and fever.   HENT: Negative for rhinorrhea and sore throat.    Eyes: Negative for photophobia and pain.   Respiratory: Positive for shortness of breath. Negative for cough.    Cardiovascular: Positive for chest pain. Negative for leg swelling.   Gastrointestinal: Positive for abdominal distention, abdominal pain and constipation. Negative for blood in stool.   Genitourinary: Negative for dysuria and hematuria.   Musculoskeletal: Negative for back pain and neck pain.   Skin: Negative for color change and pallor.   Neurological: Negative for dizziness, syncope, numbness and headaches.   Psychiatric/Behavioral: Negative for agitation and  behavioral problems.   All other systems reviewed and are negative.    Allergies:  The patient has no known allergies.     Medications:  There are no current prescribed medications.     Past Medical History:     Inguinal hernia     Past Surgical History:    Eye surgery  Herniorrhaphy inguinal      Family History:    He denies past family history including sudden heart attack or disease.     Social History:  He reports to the ED alone.   He works in a [employer] store.   He has vaped for the past 4-5 years.   He drinks alcohol socially.    He smokes weed.     Physical Exam     Patient Vitals for the past 24 hrs:   BP Temp Temp src Pulse Resp SpO2   08/12/22 1700 -- -- -- 75 21 100 %   08/12/22 1600 115/77 -- -- 78 23 100 %   08/12/22 1500 121/77 -- -- 65 18 100 %   08/12/22 1430 114/77 -- -- 71 23 100 %   08/12/22 1415 115/79 -- -- 73 18 100 %   08/12/22 1345 112/84 -- -- 74 21 99 %   08/12/22 1330 109/73 -- -- 73 23 99 %   08/12/22 1315 106/70 -- -- 72 19 100 %   08/12/22 1300 117/82 -- -- 81 17 100 %   08/12/22 1230 122/67 98.3  F (36.8  C) Temporal 76 18 100 %       Physical Exam  Constitutional:       Appearance: Normal appearance.   HENT:      Head: Normocephalic and atraumatic.   Eyes:      Extraocular Movements: Extraocular movements intact.      Conjunctiva/sclera: Conjunctivae normal.   Cardiovascular:      Rate and Rhythm: Normal rate and regular rhythm.   Pulmonary:      Effort: Pulmonary effort is normal. No respiratory distress.      Breath sounds: Normal breath sounds.   Abdominal:      General: Abdomen is flat. There is no distension.      Palpations: Abdomen is soft.      Tenderness: There is no abdominal tenderness.   Musculoskeletal:         General: Normal range of motion.      Cervical back: Normal range of motion and neck supple.      Right lower leg: No edema.      Left lower leg: No edema.   Skin:     General: Skin is warm and dry.   Neurological:      General: No focal deficit present.      Mental  Status: He is alert and oriented to person, place, and time.   Psychiatric:         Mood and Affect: Mood normal.         Behavior: Behavior normal.       Emergency Department Course   ECG  ECG taken at 1141, ECG read at 1143  Sinus rhythm.  RSR (V1), probably normal for age.    Left axis for age.   Abnormal ECG.  Rate 73 bpm. GA interval 152 ms. QRS duration 97 ms. QT/QTc 354/376 ms. P-R-T axes 62 -30 31.     Imaging:  XR Chest 2 Views   Final Result   IMPRESSION: Negative chest.      WALT FRANZ MD            SYSTEM ID:  [id number]        Report per radiology    Laboratory:  Labs Ordered and Resulted from Time of ED Arrival to Time of ED Departure   COMPREHENSIVE METABOLIC PANEL - Abnormal       Result Value    Sodium 141      Potassium 4.1      Creatinine 0.81      Urea Nitrogen 8.9      Chloride 101      Carbon Dioxide (CO2) 30 (*)     Anion Gap 10      Glucose 89      Calcium 9.6      Protein Total 7.4      Albumin 4.5      Bilirubin Total 0.4      Alkaline Phosphatase 40      AST 19      ALT 18      GFR Estimate >90     CBC WITH PLATELETS AND DIFFERENTIAL - Abnormal    WBC Count 2.8 (*)     RBC Count 5.45      Hemoglobin 15.6      Hematocrit 47.9      MCV 88      MCH 28.6      MCHC 32.6      RDW 11.9      Platelet Count 210      % Neutrophils 50      % Lymphocytes 38      % Monocytes 7      % Eosinophils 4      % Basophils 1      % Immature Granulocytes 0      NRBCs per 100 WBC 0      Absolute Neutrophils 1.4 (*)     Absolute Lymphocytes 1.1      Absolute Monocytes 0.2      Absolute Eosinophils 0.1      Absolute Basophils 0.0      Absolute Immature Granulocytes 0.0      Absolute NRBCs 0.0     TROPONIN T, HIGH SENSITIVITY - Normal    Troponin T, High Sensitivity <6     D DIMER QUANTITATIVE - Normal    D-Dimer Quantitative <0.27       Emergency Department Course:    Reviewed:  I reviewed nursing notes, vitals, past medical history and Care Everywhere    Assessments/Consults:   ED Course as of 08/12/22 1811   Fri  Aug 12, 2022   1330 I evaluated the patient and obtained the history.    1356 D-Dimer Quantitative: <0.27   1452 XR Chest 2 Views  negative   1644 I rechecked the patient and explained findings.     Updated patient on lab and image findings.  He chest pain-free.  Offered patient repeat troponin.  However, patient states that he has been having this intermittent chest pain for the past week at least.  Patient states that he would prefer to follow-up outpatient with primary care provider.  Advised patient to stop using vape.  Patient states that he will follow-up with his primary care doctor for assistant in cessation of vaping.  Discussed strict return precautions.  Answered all questions.  Patient voiced understanding and agreement with plan.     Interventions:  Medications - No data to display    Disposition:  The patient was discharged to home.     Impression & Plan     Medical Decision Makin-year-old male as described above presents to the emergency department for left-sided sharp chest pain for at least the past year that is nonexertional and spontaneous and intermittent in timing.  Patient does have history of vape usage.  Denies history of PE.  Although patient is PERC negative, due to pleuritic nature of chest pain, we will order D-dimer for evaluation for embolism.  Heart score of 1 given smoking.  Given chest pain has been intermittent for at least a week, we will order 1 set troponin.  Discussed care plan with patient who voiced understanding and agreement with plan.  Answered all questions.  Additional work-up and orders as listed in chart.    Patient had negative work-up and was discharged home to follow-up with primary care provider for establishment of care and for further evaluation as needed.    Please refer to ED course above for details and updates s/p completion of work-up.    Diagnosis:    ICD-10-CM    1. Chest pain, unspecified type  R07.9      Scribe Disclosure:  ISAIAS ZAMORA  am serving as a scribe at 12:58 PM on 8/12/2022 to document services personally performed by Teo Story DO based on my observations and the provider's statements to me.        Teo Story DO  08/12/22 1811

## 2022-08-12 NOTE — PATIENT INSTRUCTIONS
Go to the [facility] emergency room for further evaluation of the left sided chest pain.

## 2022-09-25 ENCOUNTER — NURSE TRIAGE (OUTPATIENT)
Dept: NURSING | Facility: CLINIC | Age: 27
End: 2022-09-25

## 2022-09-25 NOTE — TELEPHONE ENCOUNTER
"Pt. Calling in to nurse triage tonight with left side low back pain and radiating pain down left leg. Pt reports to RN that beginning 3 days ago he began having really bad lower back pain that shoots all the way down his left leg. He believes this pain to be at least somewhat sciatic in nature. Pain is exclusive to left side of both back and leg. Pain rating is verbally 10/10 per Pt. Report and is stabbing in quality.  Pt details that it \"feels like someone is stabbing me\". Pt states that he had this happen before about two years ago and it took a great deal of time to resolve. Pt. Can bear weight on leg but pain makes it difficult to walk. Pt works out but there has been no real change to lifting or strenuous activities. No injuries. Interim care advice given per protocol.  Disposition to see PCP within 3 days. Pt will report to [facility] in [place] to be seen tomorrow morning. Pt amenable to this plan and verbalizes agreement and understanding.    Sandie High RN, MN, PHN on 9/25/2022 at 7:05 PM  [facility]  RN utilized sound nursing judgement based on facility triage protocols during this encounter.              Reason for Disposition    [1] Pain radiates into the thigh or further down the leg AND [2] one leg    Additional Information    Negative: Passed out (i.e., lost consciousness, collapsed and was not responding)    Negative: Shock suspected (e.g., cold/pale/clammy skin, too weak to stand, low BP, rapid pulse)    Negative: Sounds like a life-threatening emergency to the triager    Negative: Major injury to the back (e.g., MVA, fall > 10 feet or 3 meters, penetrating injury, etc.)    Negative: Followed a tailbone injury    Negative: [1] Pain in the upper back over the ribs (rib cage) AND [2] radiates (travels, goes) into chest    Negative: [1] Pain in the upper back over the ribs (rib cage) AND [2] worsened by coughing (or clearly increases with breathing)    Negative: Back pain during " "pregnancy    Negative: Pain mainly in flank (i.e., in the side, over the lower ribs or just below the ribs)    Negative: [1] SEVERE back pain (e.g., excruciating) AND [2] sudden onset AND [3] age > 60 years    Negative: [1] Unable to urinate (or only a few drops) > 4 hours AND [2] bladder feels very full (e.g., palpable bladder or strong urge to urinate)    Negative: [1] Loss of bladder or bowel control (urine or bowel incontinence; wetting self, leaking stool) AND [2] new-onset    Negative: Numbness in groin or rectal area (i.e., loss of sensation)    Negative: [1] SEVERE abdominal pain AND [2] present > 1 hour    Negative: [1] Abdominal pain AND [2] age > 60 years    Negative: Weakness of a leg or foot (e.g., unable to bear weight, dragging foot)    Negative: Unable to walk    Negative: Patient sounds very sick or weak to the triager    Negative: [1] SEVERE back pain (e.g., excruciating, unable to do any normal activities) AND [2] not improved 2 hours after pain medicine    Negative: [1] Pain radiates into the thigh or further down the leg AND [2] both legs    Negative: [1] Pain or burning with passing urine (urination) AND [2] flank pain (i.e., in side, below ribs and above hip)    Negative: [1] Fever > 100.0 F (37.8 C) AND [2] flank pain (i.e., in side, below ribs and above hip)    Negative: Numbness in a leg or foot (i.e., loss of sensation)    Negative: [1] Numbness in an arm or hand (i.e., loss of sensation) AND [2] upper back pain    Negative: High-risk adult (e.g., history of cancer, HIV, or IV drug use)    Negative: Soft tissue infection (e.g., abscess, cellulitis) or other serious infection (e.g., bacteremia) in last 2 weeks    Negative: [1] Fever AND [2] no symptoms of UTI  (Exception: has generalized muscle pains, not localized back pain)    Negative: Rash in same area as pain (may be described as \"small blisters\")    Negative: Blood in urine (red, pink, or tea-colored)    Answer Assessment - Initial " "Assessment Questions  1. ONSET: \"When did the pain begin?\"       3 days ago it started it was so random woke up and almost feel to his knees   2. LOCATION: \"Where does it hurt?\" (upper, mid or lower back)      Left side low back and all the way down the leg     3. SEVERITY: \"How bad is the pain?\"  (e.g., Scale 1-10; mild, moderate, or severe)    - MILD (1-3): doesn't interfere with normal activities     - MODERATE (4-7): interferes with normal activities or awakens from sleep     - SEVERE (8-10): excruciating pain, unable to do any normal activities       10/10     4. PATTERN: \"Is the pain constant?\" (e.g., yes, no; constant, intermittent)       Constant has some peaks when I move my leg a certain way - stretching hurt really bad   5. RADIATION: \"Does the pain shoot into your legs or elsewhere?\"      Radiated into upper / mid back and all the way down left leg     6. CAUSE:  \"What do you think is causing the back pain?\"       Unknown - sciatic nerve     7. BACK OVERUSE:  \"Any recent lifting of heavy objects, strenuous work or exercise?\"      Have been working out - nothing other than usual workout     8. MEDICATIONS: \"What have you taken so far for the pain?\" (e.g., nothing, acetaminophen, NSAIDS)      Have been using acetaminophen 500 mg , Icy hot rub topical.     9. NEUROLOGIC SYMPTOMS: \"Do you have any weakness, numbness, or problems with bowel/bladder control?\"      No bowel or bladder,  No numbness or tingling - just sharp pain in leg and lower back - lower back pain is only on that left side     10. OTHER SYMPTOMS: \"Do you have any other symptoms?\" (e.g., fever, abdominal pain, burning with urination, blood in urine)        No    Protocols used: BACK PAIN-A-AH    COVID 19 Nurse Triage Plan/Patient Instructions    Please be aware that novel coronavirus (COVID-19) may be circulating in the community. If you develop symptoms such as fever, cough, or SOB or if you have concerns about the presence of another " infection including coronavirus (COVID-19), please contact your health care provider or visit [email or web address].     Disposition/Instructions    In-Person Visit with provider recommended. Reference Visit Selection Guide.    Thank you for taking steps to prevent the spread of this virus.  o Limit your contact with others.  o Wear a simple mask to cover your cough.  o Wash your hands well and often.    Resources    [facility]: About COVID-19: [email or web address]    CDC: What to Do If You're Sick: www.cdc.gov/coronavirus/2019-ncov/about/steps-when-sick.html    CDC: Ending Home Isolation: www.cdc.gov/coronavirus/2019-ncov/hcp/disposition-in-home-patients.html     CDC: Caring for Someone: www.cdc.gov/coronavirus/2019-ncov/if-you-are-sick/care-for-someone.html     [facility]: Interim Guidance for Hospital Discharge to Home: www.[facility]./diseases/coronavirus/hcp/hospdischarge.pdf    [facility] clinical trials (COVID-19 research studies): clinicalaffairs.[facility]-clinical-trials     Below are the COVID-19 hotlines at the [place] [facility] ([facility]). Interpreters are available.   o For health questions: Call 277-504-0777 or 1-781.422.8055 (7 a.m. to 7 p.m.)  o For questions about schools and childcare: Call 924-767-3163 or 1-685.994.4080 (7 a.m. to 7 p.m.)

## 2022-09-26 ENCOUNTER — OFFICE VISIT (OUTPATIENT)
Dept: FAMILY MEDICINE | Facility: CLINIC | Age: 27
End: 2022-09-26
Payer: COMMERCIAL

## 2022-09-26 VITALS
TEMPERATURE: 97.8 F | OXYGEN SATURATION: 98 % | BODY MASS INDEX: 24.78 KG/M2 | WEIGHT: 163 LBS | HEART RATE: 82 BPM | SYSTOLIC BLOOD PRESSURE: 108 MMHG | DIASTOLIC BLOOD PRESSURE: 67 MMHG | RESPIRATION RATE: 16 BRPM

## 2022-09-26 DIAGNOSIS — M54.16 LUMBAR RADICULOPATHY: Primary | ICD-10-CM

## 2022-09-26 PROCEDURE — 99213 OFFICE O/P EST LOW 20 MIN: CPT | Performed by: PHYSICIAN ASSISTANT

## 2022-09-26 RX ORDER — METHYLPREDNISOLONE 4 MG
TABLET, DOSE PACK ORAL
Qty: 21 TABLET | Refills: 0 | Status: SHIPPED | OUTPATIENT
Start: 2022-09-26 | End: 2023-08-04

## 2022-09-26 RX ORDER — IBUPROFEN 200 MG
600 TABLET ORAL ONCE
Status: COMPLETED | OUTPATIENT
Start: 2022-09-26 | End: 2022-09-26

## 2022-09-26 RX ADMIN — Medication 600 MG: at 16:26

## 2022-09-26 NOTE — PATIENT INSTRUCTIONS
Ice for the next 2-3 days.  Modify activity.  Mild pain is okay as long as it resolves after a minute or 2 of discontinuing the activity.  Decreased level activity if pain is moderate to severe or last longer than a few minutes after discontinuing.     Ibuprofen 400-600 mg (2-3 of the 200 mg OTC tablets or 400-600 mg of the children's liquid) up to 4 times daily with food or milk  Tylenol 500-1000 mg every 8 hours as needed    Follow up with PT    Take the steroid ximena the next week and this will help with pain

## 2022-09-26 NOTE — PROGRESS NOTES
Chief Complaint   Patient presents with     Back Pain     Back pain more on left side        ASSESSMENT/PLAN:  Abraham was seen today for back pain.    Diagnoses and all orders for this visit:    Lumbar radiculopathy  -     methylPREDNISolone (MEDROL DOSEPAK) 4 MG tablet therapy pack; Follow Package Directions  -     Physical Therapy Referral; Future  -     ibuprofen (ADVIL/MOTRIN) tablet 600 mg      Patient symptoms consistent with lumbar radiculopathy and possible muscle spasm.  We will do a trial of Medrol Dosepak, discussed medications proper administration and side effects.  Recommend PT follow-up.,  Heat massage, range of motion and activity modification.  Ibuprofen and Tylenol as needed for pain.  Given ibuprofen the clinic today    Hector Overton PA-C      SUBJECTIVE:  Abraham is a 27 year old male who presents to urgent care with acute left-sided back pain.  It happened 2 or 3 days ago when he got out of bed.  It feels like someone stabbing him and will shoot down the side of his left leg.  No weakness but does have worsening pain with walking and bending over.  No saddle paresthesias, foot drop or urinary incontinence.  Has had sciatica-like pain in the past.    ROS: Pertinent ROS neg other than the symptoms noted above in the HPI.     OBJECTIVE:  /67   Pulse 82   Temp 97.8  F (36.6  C) (Oral)   Resp 16   Wt 73.9 kg (163 lb)   SpO2 98%   BMI 24.78 kg/m     GENERAL: healthy, alert and no distress  EYES: Eyes grossly normal to inspection, PERRL and conjunctivae and sclerae normal  MS: no gross musculoskeletal defects noted, no edema.  No midline back tenderness, diffuse left lumbar paravertebral musculature tenderness.  No pain with hip and knee flexion.,  Straight leg raise positive  SKIN: no suspicious lesions or rashes  NEURO: Normal strength and tone, mentation intact and speech normal, lower EXTR strength 5/5, D10 patellar reflex 1+ bilaterally and symmetric    DIAGNOSTICS    No  results found for any visits on 09/26/22.     No current outpatient medications on file.     No current facility-administered medications for this visit.      Patient Active Problem List   Diagnosis     Headache     Chronic rhinitis     Inguinal hernia, right      Past Medical History:   Diagnosis Date     NO ACTIVE PROBLEMS      Past Surgical History:   Procedure Laterality Date     EYE SURGERY      5 years old     HERNIORRHAPHY INGUINAL  5/23/2012    Procedure:HERNIORRHAPHY INGUINAL; Right Inguinal Hernia Repair; Surgeon:JOHNATHON HAYS; Location:[facility] OR     NO HISTORY OF SURGERY       Family History   Problem Relation Age of Onset     Diabetes Maternal Grandmother      Hypertension Maternal Grandmother      Diabetes Maternal Grandfather      Hypertension Maternal Grandfather      Hypertension Paternal Grandmother      Hypertension Paternal Grandfather      Social History     Tobacco Use     Smoking status: Current Every Day Smoker     Types: Other     Smokeless tobacco: Never Used     Tobacco comment: vaping    Substance Use Topics     Alcohol use: Yes     Comment: occ.               The plan of care was discussed with the patient. They understand and agree with the course of treatment prescribed. A printed summary was given including instructions and medications.  The use of Dragon/[employer] dictation services may have been used to construct the content in this note; any grammatical or spelling errors are non-intentional. Please contact the author of this note directly if you are in need of any clarification.

## 2022-09-28 ENCOUNTER — THERAPY VISIT (OUTPATIENT)
Dept: PHYSICAL THERAPY | Facility: CLINIC | Age: 27
End: 2022-09-28
Attending: PHYSICIAN ASSISTANT
Payer: COMMERCIAL

## 2022-09-28 DIAGNOSIS — M54.16 LUMBAR RADICULOPATHY: ICD-10-CM

## 2022-09-28 PROCEDURE — 97161 PT EVAL LOW COMPLEX 20 MIN: CPT | Mod: GP | Performed by: PHYSICAL THERAPIST

## 2022-09-28 PROCEDURE — 97110 THERAPEUTIC EXERCISES: CPT | Mod: GP | Performed by: PHYSICAL THERAPIST

## 2022-09-28 PROCEDURE — 97530 THERAPEUTIC ACTIVITIES: CPT | Mod: GP | Performed by: PHYSICAL THERAPIST

## 2022-09-28 NOTE — PROGRESS NOTES
[facility] Initial Evaluation -- Lumbar    Date: September 28, 2022  Abraham Ying is a 27 year old male with a lumbar condition.   Referral: Hector Overton PA-C  Work mechanical stresses:  Sales, standing, bending  Employment status:  Full time  Leisure mechanical stresses: sports: skate boarding, lifting 3-4x week  Functional disability score (LORI/STarT Back):  See flow sheet  VAS score (0-10): 4/10  Patient goals/expectations:  Pain relief    HISTORY:    Present symptoms: left lower back, L LE to lower leg  Pain quality (sharp/shooting/stabbing/aching/burning/cramping):  aching   Paresthesia (yes/no):  no    Present since (onset date): September 22, 2022.     Symptoms (improving/unchanging/worsening):  improving.     Symptoms commenced as a result of: no apparnet reason, woke up with it  Condition occurred in the following environment:   home     Symptoms at onset (back/thigh/leg): back, leg  Constant symptoms (back/thigh/leg): back, leg  Intermittent symptoms (back/thigh/leg):     Symptoms are made worse with the following: Always Bending, Always Sitting, Always Standing, Time of day - Always AM and Always PM and Always When still   Symptoms are made better with the following: Always On the move, muscle relaxants    Disturbed sleep (yes/no):  no Sleeping postures (prone/sup/side R/L): sides    Previous episodes (0/1-5/6-10/11+): 1 Year of first episode:     Previous history: onset of lower back issues, onset of L LE after sleeping on a couch  Previous treatments: muscle relaxants      Specific Questions:  Cough/Sneeze/Strain (pos/neg): positive with strain  Bowel/Bladder (normal/abnormal): normal  Gait (normal/abnormal): normal  Medications (nil/NSAIDS/analg/steroids/anticoag/other):  Muscle relaxants  Medical allergies:  See chart  General health (excellent/good/fair/poor):  excellent  Pertinent medical history:  None  Imaging (None/Xray/MRI/Other):  none  Recent or major surgery  (yes/no):  no  Night pain (yes/no): no  Accidents (yes/no): none  Unexplained weight loss (yes/no): none  Barriers at home: none  Other red flags: none    EXAMINATION    Posture:   Sitting (good/fair/poor): fair to poor  Standing (good/fair/poor):fair  Lordosis (red/acc/normal): normal  Correction of posture (better/worse/no effect): worse    Lateral Shift (right/left/nil): nil  Relevant (yes/no):  no  Other Observations: none    Neurological:    Motor deficit:  intact  Reflexes:  Not tested  Sensory deficit:  intact  Dural signs:  ++Slump L    Movement Loss:   [place] Mod Min Nil Pain   Flexion  x   pdm   Extension  x x  erp   Side Gliding R   x  pdm   Side Gliding L  x   pdm     Test Movements:   During: produces, abolishes, increases, decreases, no effect, centralizing, peripheralizing   After: better, worse, no better, no worse, no effect, centralized, peripheralized    Pretest symptoms standing:    Symptoms During Symptoms After ROM increased ROM decreased No Effect   FIS        Rep FIS        EIS        Rep EIS          Pretest symptoms lying: L buttocks   Symptoms During Symptoms After ROM increased ROM decreased No Effect   GURDEEP        Rep GURDEEP        EIL Increases    Peripheralised         Rep EIL Increases  Peripheralising    Peripheralised     x      If required, pretest symptoms: L buttocks, L LE to lower leg   Symptoms During Symptoms After ROM increased ROM decreased No Effect   SGIS - R        Rep SGIS - R        SGIS - L Increases  Peripheralising    No Worse         Rep SGIS - L Increases  Peripheralising    No Worse      x     Static Tests:  Sitting slouched:     Sitting erect:    Standing slouched:   Standing erect:    Lying prone in extension:   Long sitting:      Other Tests: sustained flexion/rotation: decr, centralizing/better, centralized to knee from lower leg, incr EXT, L SG    Provisional Classification:  Derangement - Asymmetrical, unilateral, symptoms below knee    Principle of  Management:  Education:  Centralization, therapeutic dose of exercise, avoidance of flexion   Equipment provided:    Mechanical therapy (Y/N):  Y   Extension principle:    Lateral Principle:  Sustained flex/rotation, doorway L SGIS  Flexion principle:    Other:      ASSESSMENT/PLAN:    Patient is a 27 year old male with lumbar complaints.    Patient has the following significant findings with corresponding treatment plan.                Diagnosis 1:  L lumbar radiculopathy  Pain -  manual therapy, self management, education, directional preference exercise and home program  Decreased ROM/flexibility - manual therapy and therapeutic exercise  Decreased function - therapeutic activities  Impaired posture - neuro re-education    Therapy Evaluation Codes:   1) History comprised of:   Personal factors that impact the plan of care:      None.    Comorbidity factors that impact the plan of care are:      None.     Medications impacting care: Anti-inflammatory and Muscle relaxant.  2) Examination of Body Systems comprised of:   Body structures and functions that impact the plan of care:      Lumbar spine.   Activity limitations that impact the plan of care are:      Bending, Driving, Lifting, Sitting and Standing.  3) Clinical presentation characteristics are:   Evolving/Changing.  4) Decision-Making    Low complexity using standardized patient assessment instrument and/or measureable assessment of functional outcome.  Cumulative Therapy Evaluation is: Low complexity.    Previous and current functional limitations:  (See Goal Flow Sheet for this information)    Short term and Long term goals: (See Goal Flow Sheet for this information)     Communication ability:  Patient appears to be able to clearly communicate and understand verbal and written communication and follow directions correctly.  Treatment Explanation - The following has been discussed with the patient:   RX ordered/plan of care  Anticipated outcomes  Possible  risks and side effects  This patient would benefit from PT intervention to resume normal activities.   Rehab potential is good.    Frequency:  1 X week, once daily  Duration:  for 8 weeks  Discharge Plan:  Achieve all LTG.  Independent in home treatment program.  Reach maximal therapeutic benefit.    Please refer to the daily flowsheet for treatment today, total treatment time and time spent performing 1:1 timed codes.

## 2022-10-18 NOTE — PROGRESS NOTES
[facility]    OUTPATIENT Physical Therapy ORTHOPEDIC EVALUATION  PLAN OF TREATMENT FOR OUTPATIENT REHABILITATION  (COMPLETE FOR INITIAL CLAIMS ONLY)  Patient's Last Name, First Name, M.I.  YOB: 1995  Abraham Ying    Provider s Name:  [facility]   Medical Record No.  [id number]   Start of Care Date:      Onset Date:       Treatment Diagnosis:  left lumbar radiculopathy Medical Diagnosis:  Lumbar radiculopathy       Goals:     09/28/22 0500   Body Part   Goals listed below are for lumbar   Goal #1   Goal #1 standing   Previous Functional Level No restrictions   Current Functional Level Hours patient can stand   Performance level 1, 4/1l0   STG Target Performance Hours patient will be able to stand   Performance level 1, 2/10   Rationale for housekeeping tasks such as vacuuming, bed making, mowing, gardening;for meal preparation;for return to work duties   Due date 10/19/22   LTG Target Performance Hours patient will be able to stand   Performance Level 1+, painfree   Rationale for return to work duties;for housekeeping tasks such as vacuuming, bed making, mowing;for meal preparation   Due date 11/09/22       Therapy Frequency:     Predicted Duration of Therapy Intervention:       Sissy Dumont, PT                 I CERTIFY THE NEED FOR THESE SERVICES FURNISHED UNDER        THIS PLAN OF TREATMENT AND WHILE UNDER MY CARE     (Physician attestation of this document indicates review and certification of the therapy plan).                     Certification Date From:      Certification Date To:       Referring Provider:  Hector Overton    Initial Assessment        See Epic Evaluation

## 2022-12-19 PROBLEM — M54.16 LUMBAR RADICULOPATHY: Status: RESOLVED | Noted: 2022-09-28 | Resolved: 2022-12-19

## 2023-08-04 ENCOUNTER — OFFICE VISIT (OUTPATIENT)
Dept: URGENT CARE | Facility: URGENT CARE | Age: 28
End: 2023-08-04
Payer: COMMERCIAL

## 2023-08-04 VITALS
BODY MASS INDEX: 24.25 KG/M2 | DIASTOLIC BLOOD PRESSURE: 81 MMHG | HEART RATE: 77 BPM | TEMPERATURE: 97.6 F | HEIGHT: 68 IN | OXYGEN SATURATION: 98 % | SYSTOLIC BLOOD PRESSURE: 126 MMHG | RESPIRATION RATE: 16 BRPM | WEIGHT: 160 LBS

## 2023-08-04 DIAGNOSIS — Z11.3 SCREEN FOR STD (SEXUALLY TRANSMITTED DISEASE): Primary | ICD-10-CM

## 2023-08-04 PROCEDURE — 99213 OFFICE O/P EST LOW 20 MIN: CPT | Performed by: PHYSICIAN ASSISTANT

## 2023-08-04 PROCEDURE — 87491 CHLMYD TRACH DNA AMP PROBE: CPT | Performed by: PHYSICIAN ASSISTANT

## 2023-08-04 PROCEDURE — 87591 N.GONORRHOEAE DNA AMP PROB: CPT | Performed by: PHYSICIAN ASSISTANT

## 2023-08-04 ASSESSMENT — ENCOUNTER SYMPTOMS
ABDOMINAL PAIN: 1
FEVER: 0
DYSURIA: 0

## 2023-08-04 NOTE — PROGRESS NOTES
Assessment & Plan:        ICD-10-CM    1. Screen for STD (sexually transmitted disease)  Z11.3 Chlamydia & Gonorrhea by PCR, [facility] - Clinic Collect            Plan/Clinical Decision Making:    Patient would like STD screening today for gonorrhea and chlamydia. Partner told him to get screened, no known exposure. Declines other STI testing today. Asymptomatic.   Will contact with any positive results.       Return if symptoms worsen or fail to improve.     At the end of the encounter, I discussed results, diagnosis, medications. Discussed red flags for immediate return to clinic/ER, as well as indications for follow up if no improvement. Patient understood and agreed to plan. Patient was stable for discharge.        Marli Carpenter PA-C on 8/4/2023 at 3:30 PM          Subjective:     HPI:    Axel is a 27 year old male who presents to clinic today for the following health issues:  Chief Complaint   Patient presents with    Urgent Care     Patient states his partner called him and stated she has symptoms of GC and he wants to be tested unsure of time of exposure. No Symptoms     HPI    Patient would like to be screened for GC/chlamydia. His partner having vaginal symptoms and seeing [facility] today. No known exposure to STD, but she is having symptoms of something.     History obtained from the patient.    Review of Systems   Constitutional:  Negative for fever.   Gastrointestinal:  Positive for abdominal pain (chronic lower abdominal pain.).   Genitourinary:  Negative for dysuria, penile discharge, penile swelling and scrotal swelling.         Patient Active Problem List   Diagnosis    Headache    Chronic rhinitis    Inguinal hernia, right        Past Medical History:   Diagnosis Date    NO ACTIVE PROBLEMS        Social History     Tobacco Use    Smoking status: Every Day     Types: Other    Smokeless tobacco: Never    Tobacco comments:     vaping    Substance Use Topics    Alcohol use: Yes      "Comment: occ.              Objective:     Vitals:    08/04/23 1524   BP: 126/81   Pulse: 77   Resp: 16   Temp: 97.6  F (36.4  C)   TempSrc: Tympanic   SpO2: 98%   Weight: 72.6 kg (160 lb)   Height: 1.727 m (5' 8\")         Physical Exam   EXAM:   Pleasant, alert, appropriate appearance. NAD.  Head Exam: Normocephalic, atraumatic.  Eye Exam:  PERRLA, EOMI, non icteric/injection.    Neuro: CN II-XII intact grossly intact.  Skin: no rash or lesion.      Results:  No results found for any visits on 08/04/23.    "

## 2023-08-05 LAB
C TRACH DNA SPEC QL PROBE+SIG AMP: NEGATIVE
N GONORRHOEA DNA SPEC QL NAA+PROBE: NEGATIVE

## 2023-09-23 ENCOUNTER — HEALTH MAINTENANCE LETTER (OUTPATIENT)
Age: 28
End: 2023-09-23

## 2023-10-04 ENCOUNTER — TRANSFERRED RECORDS (OUTPATIENT)
Dept: HEALTH INFORMATION MANAGEMENT | Facility: CLINIC | Age: 28
End: 2023-10-04
Payer: COMMERCIAL

## 2023-10-06 ENCOUNTER — OFFICE VISIT (OUTPATIENT)
Dept: FAMILY MEDICINE | Facility: CLINIC | Age: 28
End: 2023-10-06
Payer: COMMERCIAL

## 2023-10-06 VITALS
HEIGHT: 67 IN | BODY MASS INDEX: 24.03 KG/M2 | WEIGHT: 153.1 LBS | OXYGEN SATURATION: 100 % | RESPIRATION RATE: 16 BRPM | SYSTOLIC BLOOD PRESSURE: 110 MMHG | DIASTOLIC BLOOD PRESSURE: 82 MMHG | HEART RATE: 65 BPM | TEMPERATURE: 98.2 F

## 2023-10-06 DIAGNOSIS — K59.01 SLOW TRANSIT CONSTIPATION: ICD-10-CM

## 2023-10-06 DIAGNOSIS — Z00.00 ROUTINE GENERAL MEDICAL EXAMINATION AT A HEALTH CARE FACILITY: Primary | ICD-10-CM

## 2023-10-06 DIAGNOSIS — Z31.89 ENCOUNTER FOR FERTILITY PLANNING: ICD-10-CM

## 2023-10-06 LAB
ALBUMIN SERPL BCG-MCNC: 4.5 G/DL (ref 3.5–5.2)
ALP SERPL-CCNC: 43 U/L (ref 40–129)
ALT SERPL W P-5'-P-CCNC: 12 U/L (ref 0–70)
ANION GAP SERPL CALCULATED.3IONS-SCNC: 9 MMOL/L (ref 7–15)
AST SERPL W P-5'-P-CCNC: 19 U/L (ref 0–45)
BILIRUB SERPL-MCNC: 0.7 MG/DL
BUN SERPL-MCNC: 11 MG/DL (ref 6–20)
CALCIUM SERPL-MCNC: 9.6 MG/DL (ref 8.6–10)
CHLORIDE SERPL-SCNC: 103 MMOL/L (ref 98–107)
CHOLEST SERPL-MCNC: 143 MG/DL
CREAT SERPL-MCNC: 0.9 MG/DL (ref 0.67–1.17)
DEPRECATED HCO3 PLAS-SCNC: 29 MMOL/L (ref 22–29)
EGFRCR SERPLBLD CKD-EPI 2021: >90 ML/MIN/1.73M2
ERYTHROCYTE [DISTWIDTH] IN BLOOD BY AUTOMATED COUNT: 11.6 % (ref 10–15)
GLUCOSE SERPL-MCNC: 87 MG/DL (ref 70–99)
HCT VFR BLD AUTO: 45.8 % (ref 40–53)
HDLC SERPL-MCNC: 44 MG/DL
HGB BLD-MCNC: 15.4 G/DL (ref 13.3–17.7)
LDLC SERPL CALC-MCNC: 88 MG/DL
MCH RBC QN AUTO: 28.6 PG (ref 26.5–33)
MCHC RBC AUTO-ENTMCNC: 33.6 G/DL (ref 31.5–36.5)
MCV RBC AUTO: 85 FL (ref 78–100)
NONHDLC SERPL-MCNC: 99 MG/DL
PLATELET # BLD AUTO: 221 10E3/UL (ref 150–450)
POTASSIUM SERPL-SCNC: 4.1 MMOL/L (ref 3.4–5.3)
PROT SERPL-MCNC: 7.1 G/DL (ref 6.4–8.3)
RBC # BLD AUTO: 5.39 10E6/UL (ref 4.4–5.9)
SODIUM SERPL-SCNC: 141 MMOL/L (ref 135–145)
TRIGL SERPL-MCNC: 57 MG/DL
WBC # BLD AUTO: 2.2 10E3/UL (ref 4–11)

## 2023-10-06 PROCEDURE — 84270 ASSAY OF SEX HORMONE GLOBUL: CPT | Performed by: GENERAL PRACTICE

## 2023-10-06 PROCEDURE — 80053 COMPREHEN METABOLIC PANEL: CPT | Performed by: GENERAL PRACTICE

## 2023-10-06 PROCEDURE — 36415 COLL VENOUS BLD VENIPUNCTURE: CPT | Performed by: GENERAL PRACTICE

## 2023-10-06 PROCEDURE — 99385 PREV VISIT NEW AGE 18-39: CPT | Performed by: GENERAL PRACTICE

## 2023-10-06 PROCEDURE — 84403 ASSAY OF TOTAL TESTOSTERONE: CPT | Performed by: GENERAL PRACTICE

## 2023-10-06 PROCEDURE — 85027 COMPLETE CBC AUTOMATED: CPT | Performed by: GENERAL PRACTICE

## 2023-10-06 PROCEDURE — 80061 LIPID PANEL: CPT | Performed by: GENERAL PRACTICE

## 2023-10-06 PROCEDURE — 99213 OFFICE O/P EST LOW 20 MIN: CPT | Mod: 25 | Performed by: GENERAL PRACTICE

## 2023-10-06 RX ORDER — POLYETHYLENE GLYCOL 3350 17 G/17G
1 POWDER, FOR SOLUTION ORAL DAILY
Qty: 510 G | Refills: 3 | Status: SHIPPED | OUTPATIENT
Start: 2023-10-06

## 2023-10-06 ASSESSMENT — ENCOUNTER SYMPTOMS
NERVOUS/ANXIOUS: 1
CONSTIPATION: 1
ABDOMINAL PAIN: 1

## 2023-10-06 ASSESSMENT — PAIN SCALES - GENERAL: PAINLEVEL: NO PAIN (0)

## 2023-10-06 NOTE — PROGRESS NOTES
SUBJECTIVE:   CC: Axel is an 28 year old who presents for preventative health visit.       10/6/2023    10:13 AM   Additional Questions   Roomed by Marli LANGLEY CMA   Accompanied by YESI         10/6/2023    10:13 AM   Patient Reported Additional Medications   Patient reports taking the following new medications None       Here for a physical and fertility tests  Wants to check testosterone-wants to know the level. No symptoms  Has been trying to have a child for 2  years-wants sperm count  Constipation-tried probiotic, gut cleanse( one time), been going on for 2-3 years.    Healthy Habits:     Getting at least 3 servings of Calcium per day:  Yes    Bi-annual eye exam:  NO    Dental care twice a year:  Yes    Sleep apnea or symptoms of sleep apnea:  None    Diet:  Breakfast skipped    Frequency of exercise:  4-5 days/week    Duration of exercise:  Greater than 60 minutes    Taking medications regularly:  Yes    Medication side effects:  Not applicable      Today's PHQ-2 Score:       10/6/2023    10:05 AM   PHQ-2 ( 1999 [employer])   Q1: Little interest or pleasure in doing things 0   Q2: Feeling down, depressed or hopeless 0   PHQ-2 Score 0   Q1: Little interest or pleasure in doing things Not at all   Q2: Feeling down, depressed or hopeless Not at all   PHQ-2 Score 0       Patient states that he would like his Testerone level checked along with other male hormone levels.     Patient also states that he has been having a episodes of constipation.   He has been seen by [facility] and was told to try a colon cleanse. He did that with no relief and will be doing another colon cleanse before he see's GI again.       Patient also states that he and his significant other are going to start trying for a baby and he would like his sperm count checked.                   Have you ever done Advance Care Planning? (For example, a Health Directive, POLST, or a discussion with a medical provider or your loved ones about your wishes): No, advance  "care planning information given to patient to review.  Patient declined advance care planning discussion at this time.    Social History     Tobacco Use    Smoking status: Every Day     Types: Other    Smokeless tobacco: Never    Tobacco comments:     vaping    Substance Use Topics    Alcohol use: Yes     Comment: occ.              10/6/2023    10:05 AM   Alcohol Use   Prescreen: >3 drinks/day or >7 drinks/week? No       Last PSA: No results found for: PSA    Reviewed orders with patient. Reviewed health maintenance and updated orders accordingly - Yes      Reviewed and updated as needed this visit by clinical staff   Tobacco  Allergies  Meds              Reviewed and updated as needed this visit by Provider                     Review of Systems   Gastrointestinal:  Positive for abdominal pain and constipation.   Genitourinary:  Negative for penile discharge.   Psychiatric/Behavioral:  The patient is nervous/anxious.    All other systems reviewed and are negative.        OBJECTIVE:   /82 (BP Location: Right arm, Patient Position: Sitting, Cuff Size: Adult Regular)   Pulse 65   Temp 98.2  F (36.8  C) (Oral)   Resp 16   Ht 1.702 m (5' 7\")   Wt 69.4 kg (153 lb 1.6 oz)   SpO2 100%   BMI 23.98 kg/m      Physical Exam  Constitutional:       Appearance: Normal appearance.   HENT:      Head: Normocephalic and atraumatic.      Right Ear: Tympanic membrane, ear canal and external ear normal.      Left Ear: Tympanic membrane, ear canal and external ear normal.      Nose: Nose normal.      Mouth/Throat:      Mouth: Mucous membranes are moist.      Pharynx: Oropharynx is clear.   Eyes:      Extraocular Movements: Extraocular movements intact.      Conjunctiva/sclera: Conjunctivae normal.      Pupils: Pupils are equal, round, and reactive to light.   Cardiovascular:      Rate and Rhythm: Normal rate and regular rhythm.      Pulses: Normal pulses.      Heart sounds: Normal heart sounds.   Pulmonary:      Effort: " Pulmonary effort is normal.      Breath sounds: Normal breath sounds.   Abdominal:      General: Abdomen is flat. Bowel sounds are normal.      Palpations: Abdomen is soft.   Musculoskeletal:         General: Normal range of motion.      Cervical back: Normal range of motion and neck supple.   Skin:     General: Skin is warm.      Capillary Refill: Capillary refill takes less than 2 seconds.   Neurological:      General: No focal deficit present.      Mental Status: He is alert and oriented to person, place, and time. Mental status is at baseline.   Psychiatric:         Mood and Affect: Mood normal.         Behavior: Behavior normal.         Thought Content: Thought content normal.         Judgment: Judgment normal.               ASSESSMENT/PLAN:   (Z00.00) Routine general medical examination at a health care facility  (primary encounter diagnosis)  Comment:   Plan: Comprehensive metabolic panel (BMP + Alb, Alk         Phos, ALT, AST, Total. Bili, TP), CBC with         platelets, Lipid panel reflex to direct LDL         Fasting            (K59.01) Slow transit constipation  Comment:   Plan: polyethylene glycol (MIRALAX) 17 GM/Dose powder        Advice to take 1 cap daily and decrease to 1/2 cap if having frequent stools            (Z31.89) Encounter for fertility planning  Comment: Been trying for ~2 years and wants testosterone level/semen check  Plan: Semen Analysis, Strict Morphology (RIAZ),         Testosterone Free and Total            Patient has been advised of split billing requirements and indicates understanding: Yes      COUNSELING:   Reviewed preventive health counseling, as reflected in patient instructions  Resource given for vaping        He reports that he has been smoking other. He has never used smokeless tobacco.  Nicotine/Tobacco Cessation Plan:   Information offered: Patient not interested at this time            Maryellen Maravilla MD  [facility]

## 2023-10-07 LAB — SHBG SERPL-SCNC: 37 NMOL/L (ref 11–80)

## 2023-10-12 LAB
TESTOST FREE SERPL-MCNC: 8.33 NG/DL
TESTOST SERPL-MCNC: 434 NG/DL (ref 240–950)

## 2024-11-16 ENCOUNTER — HEALTH MAINTENANCE LETTER (OUTPATIENT)
Age: 29
End: 2024-11-16